# Patient Record
Sex: MALE | Race: WHITE | Employment: FULL TIME | URBAN - METROPOLITAN AREA
[De-identification: names, ages, dates, MRNs, and addresses within clinical notes are randomized per-mention and may not be internally consistent; named-entity substitution may affect disease eponyms.]

---

## 2017-01-27 ENCOUNTER — HOSPITAL ENCOUNTER (EMERGENCY)
Age: 42
Discharge: HOME | End: 2017-01-28
Attending: EMERGENCY MEDICINE
Payer: MEDICAID

## 2017-01-27 VITALS
TEMPERATURE: 97.9 F | HEART RATE: 91 BPM | WEIGHT: 180 LBS | BODY MASS INDEX: 25.77 KG/M2 | OXYGEN SATURATION: 94 % | HEIGHT: 70 IN | SYSTOLIC BLOOD PRESSURE: 121 MMHG | RESPIRATION RATE: 19 BRPM | DIASTOLIC BLOOD PRESSURE: 71 MMHG

## 2017-01-27 DIAGNOSIS — Z72.0 TOBACCO ABUSE: ICD-10-CM

## 2017-01-27 DIAGNOSIS — K02.9 DENTAL DECAY: ICD-10-CM

## 2017-01-27 DIAGNOSIS — K08.89 ODONTALGIA: Primary | ICD-10-CM

## 2017-01-27 PROCEDURE — 99283 EMERGENCY DEPT VISIT LOW MDM: CPT

## 2017-01-27 NOTE — Clinical Note
Be sure to Drink plenty of water. Eat only soft foods. Remember to consume only room temperature solids & liquids. Luke warm salt water rinses three times per day alternating with Listerine rinses three times per day unless otherwise recommended by your  
healthcare provider. Ensure you are flossing your teeth once to twice daily as this will reduce likelihood of gingival/gum infections. Please take Tylenol (Acetaminophen) Extra Strength 500 mg tablets, 2 tablets by mouth every 6-8 hours as needed fo r pain and or fever. NOT TO EXCEED 4000 MG OF ACETAMINOPHEN IN A 24 HOURS PERIOD. You may also take Motrin (Ibuprofen) 200mg tablets, 3 tablets by mouth every 6-8 hours as needed for pain and or fever, to be taken with food. Do not take other NSAIDS (Non -Steroidal Anti-inflammatories) or medications containing: e.g., Celebrex (Celecoxib), Mobic (Meloxicam), Naproxen (Naprosyn), Goodies Powder, Aspirin etc... While taking Ibuprofen (Also known as Motrin, Advil). PLEASE FOLLOW-UP AS DIRECTED. RETURN T O THE EMERGENCY DEPARTMENT IF YOU ARE UNABLE TO FOLLOW-UP AS DIRECTED. RETURN TO THE EMERGENCY DEPARTMENT IF YOU HAVE SYMPTOMS THAT DO NOT IMPROVE WITH TREATMENT, NEW SYMPTOMS, WORSENING SYMPTOMS, OR ANY OTHER CONCERNS. [address]? Contact one of the Dental Clinics below and SCHEDULE an Appointment to be seen within 1 day WITHOUT FAIL! 
 
 
1) [address] 
 
[address] [facility] 
[phone]  
 
2) [address] [phone] or [phone] 
[address] [address] Norm Perez Monday thru Friday 8:00 AM  5:00 PM 
 
After Hours If you have a life threatening emergency, please call 911.   Please identify your provider by name when you obtain services at a local emergency room, urgent care center or you are admitted as inpatient into the hospital. 
 
Our physicians and dentists are on call 24 hours a day, 7 days a week 365 days per year. You can reach y our physician or dentist by calling [phone]. Please Note All locations are closed on Sundays and all major holidays [address] Dental services are available for adults and children at the [place] site and via  the mobile dental Rommie Amber in all areas of [facility]. Services include: 
Examinations and X-rays: * Review and update medical history in order to ensure that dental treatment does not conflict with current health conditions or medications* Oral cancer ex amination* Visual examination and x-rays of teeth to help detect decay or deterioration* Observation of biting, chewing and grinding patterns* Evaluation of gum tissue and bone support* Preventative and restorative dental care* Stain removal, removal of  plaque and tartar and teeth polishing* Recommendations on brushing, flossing, fluoride applications, sealants and frequency of oral examinations* Discussions of concerns or questions regarding regular oral care or possible cosmetic enhancements Preventi ve: * Stain, plaque and tartar removal and teeth polishing* Deep scale using anesthetic when necessary* Recommendations on brushing and flossing* Fluoride applications and dental sealants as needed* Discussion on the frequency for oral examination Rest orative Visits: * Dental fillings such as amalgam or composite restorations* Dental extractions* Root canals of anterior teeth* Partial or complete dentures Emergency Visits: * Extraction or tooth removal. (An appointment to alleviate pain is usually available within 24-48 hours.) Office Hours: Monday-Friday: 8:00am-5:00pm 
For an appointment call: [phone] Fax: [phone] 
Artemio Maribelazar [address] [place], [address] [facility]  3) [address] [address] [place], [facility] E [facility]) [address] at [facility] Monday-Friday 09:00AM-05:00PM 
Tuesday, Wednesday, and Thursday evenings 05:00PM-08:00PM 
 
Open one day per month: 08:00AM-Noon [facility] has legal obligations about who is eligi ble for care. It is important to read and understand the eligibility requirements and to also ensure that you bring the appropriate documentation to your appointment. Beginning 2012 we will require a current Tax Return by all of our patients. We require a  1040 Tax Return. A 4506T is no longer accepted. We cannot renew your eligibility for another year without the  1040 Tax Return. Please note that if your eligibility has  for 90 days or more then you will become inactive. E ligibility Requirements You are eligible if you meet the following criteria. 1. You do not have medical insurance, Medicaid, Medicare or [place] Benefits. 2. Your earnings are 200% or less of federal poverty guidelines (see chart below) Number in Fa nicolas 200% Federal Poverty Level Gross Yearly Salary Gross Monthly Income* Approximate Hourly Income** 
1 $22,340.00 $1,861.00 $10.74 
2 $30,260.00 $2,521.00 $14.55 
3 $38,180.00 $3,181.00 $18.36 
4 $46,100.00 $3,841.00 $22.16 
5 $54,920.00 $4,501.00 $2 6.40 
6 $61,940.00 $5,161.00 $29.78 
7 $69,860.00 $5,821.00 $33.59 
8 $77,780.00 $6,481.00 $37.39 For each additional person, add: 
 $7,920.00 $660.00 $3.81 * Yearly income divided by 12 Months **Assumes a full-time job for a full year (2080 hours) 3 . For medical care you must be a [place] resident. Any resident of [facility] may qualify for free dental services. You will be given an appointment time to attend an eligibility clinic to pre-qualify before being scheduled for medical or dental  care. Before coming to the appointment please fill out the Eligibility Checklist and ensure that you have all documents for your appointment. You'll be helped to complete all required paperwork such as contact information, medical history, consent fo rm, proof of residency, income and identification. When information is complete and you meet the income, residency and insurance guidelines, you'll be scheduled for your first appointment. If we do not have availability or you do not meet our criteria,  click the Additional Resources button below for other options. Making a Medical Appointment once you are a deemed eligible and a [facility] patient Call [phone] ext. [phone]. If no one answers leave a message. YOUR CALL WILL BE RETURNED. Making a De ntal Appointment once you are deemed an eligible patient call [phone] ext.[phone]. Please note: They are only here part time. Your call goes on a wait list and the Dental Coordinator will call you back just as soon as she has availability. Making an appo intment for the [address] Open to residents of [facility] only. ([place] ell[place], [place], [place], [place], [place], [place] car[place], [place]) Because of the volume of patients needing care, [facility] operates [address]. To Ma ke an Appointment (if you are NOT a current [facility] patient) Call [facility] at [phone] and select OPTION 2 when prompted You must first make an eligibility appointment to determine if you qualify for care You will be told if there is a waiting list and given additional information You will be called back later with a date/time and be given a list of documents to bring to your eligibility appointment You will be interviewed in person to determine if you qualify for care If y ou qualify for dental care, your first appointment may be scheduled. ELVIN KHAN'S  PROGRAM:  
 
NEED HELP ARRANGING AN APPOINTMENT WITH A CLINIC OR SPECIALIST? [address] [place] [address] Drive are well versed in our [employer] and have  connections with Physician offices which often allows them to schedule appointments within the time frame recommended!! 
 
[address] OF APPOINTMENT SCHEDULING AND FOLLOW-UP IS THAT OF  E PATIENT. DON'T HAVE THE FINANCIAL RESOURCES AVAILABLE TO PAY FOR A DOCTORS APPOINTMENT? WANT TO LEARN MORE ABOUT LOCAL FREE AND OR POTENTIALLY DISCOUNTED MEDICAL SERVICES? WANT TO KNOW MORE ABOUT THE COMMUNITY RESOURCES AVAILABLE TO YOU IN YOUR  AREA? Philipp Gonzales  Program 
Philipp Gonzales life coaches are employees of [employer] of [facility]. If any patient meets the criteria for  services  low-income, lack of insurance and no regular source of primary care  angella reyna coaches call that person to provide information and offer assistance. They can help set up ongoing care at [facility], a free clinic located on the [facility] grounds, or with the [facility], a mobile clinic operated  by [facility] for the [facility]. [facility] [address] [facility], [address] [facility] Please call:  
Mary Lou Castillo [phone] Kaitlynn Marroquin [phone] 
 
(Usual hours are Monday-Karine y 0800 AM-0800 PM) Michelle's  Program 
For Life Coaches, [facility] partner with Yahir Coleman (GHAZAL). Patients in need of a primary care physician are referred to [facility] medical staff. STOP provides the LPNs to work at [employer] and also assists with issues around education, housing, employment and  services. The two life coaches, both licensed practical nurses, help arrange the appointments and other servi richard at [employer]. [address] [facility] [address] [facility] [address] [facility] March [phone] Iva [phone] The  Program connects patients with no insurance, little resources, or no personal do ctor with primary care resources to minimize their trips to the emergency room. These coaches work closely with the emergency department staff to find medical homes for patients who regularly use emergency rooms for non-emergency issues. Operating out o [employer] and at [facility], life coaches, formally known as medical  navigators, help patients overcome social barriers to care, make follow up appointments, and obtain medications. The  Model of Care was implem ented by placing two LPNs into the [facility] Emergency Department (ED) with the purpose of connecting patients in need with primary care physicians. In the first year of the program (September 2008-September 2009), life coaches assisted 1,000 pa tients. Of those, only 12 returned to the ED for the same condition. The rest are now being cared for by their own doctors thanks to relationships established by the [employer]. About 90 percent of everyone referred to primary care doctors made  contact and set up appointments.

## 2017-01-28 PROCEDURE — 74011250637 HC RX REV CODE- 250/637: Performed by: PHYSICIAN ASSISTANT

## 2017-01-28 RX ORDER — OXYCODONE AND ACETAMINOPHEN 5; 325 MG/1; MG/1
1 TABLET ORAL
Status: COMPLETED | OUTPATIENT
Start: 2017-01-28 | End: 2017-01-28

## 2017-01-28 RX ORDER — CHLORHEXIDINE GLUCONATE 1.2 MG/ML
15 RINSE ORAL 2 TIMES DAILY
Qty: 420 ML | Refills: 0 | Status: SHIPPED | OUTPATIENT
Start: 2017-01-28 | End: 2017-02-11

## 2017-01-28 RX ORDER — CLINDAMYCIN HYDROCHLORIDE 150 MG/1
300 CAPSULE ORAL
Status: COMPLETED | OUTPATIENT
Start: 2017-01-28 | End: 2017-01-28

## 2017-01-28 RX ORDER — OXYCODONE AND ACETAMINOPHEN 5; 325 MG/1; MG/1
1 TABLET ORAL
Qty: 12 TAB | Refills: 0 | Status: SHIPPED | OUTPATIENT
Start: 2017-01-28

## 2017-01-28 RX ORDER — ALBUTEROL SULFATE 0.83 MG/ML
2.5 SOLUTION RESPIRATORY (INHALATION)
Refills: 0 | COMMUNITY
Start: 2017-01-06 | End: 2017-01-28 | Stop reason: CLARIF

## 2017-01-28 RX ORDER — CLINDAMYCIN HYDROCHLORIDE 300 MG/1
300 CAPSULE ORAL 4 TIMES DAILY
Qty: 40 CAP | Refills: 0 | Status: SHIPPED | OUTPATIENT
Start: 2017-01-28 | End: 2017-02-07

## 2017-01-28 RX ORDER — PENICILLIN V POTASSIUM 250 MG/1
500 TABLET, FILM COATED ORAL
Status: DISCONTINUED | OUTPATIENT
Start: 2017-01-28 | End: 2017-01-28

## 2017-01-28 RX ADMIN — OXYCODONE HYDROCHLORIDE AND ACETAMINOPHEN 1 TABLET: 5; 325 TABLET ORAL at 00:22

## 2017-01-28 RX ADMIN — CLINDAMYCIN HYDROCHLORIDE 300 MG: 150 CAPSULE ORAL at 00:22

## 2017-01-28 NOTE — ED TRIAGE NOTES
\"my mouth is really hurting. I was brushing my teeth tonight and a piece of tooth came out\" patient has been taking amoxicillin for gum infection.

## 2017-01-28 NOTE — DISCHARGE INSTRUCTIONS
[employer]ism[employer] Activation    Thank you for requesting access to [employer]. Please follow the instructions below to securely access and download your online medical record. [employer] allows you to send messages to your doctor, view your test results, renew your prescriptions, schedule appointments, and more. How Do I Sign Up? 1. In your internet browser, go to [email or web address]  2. Click on the First Time User? Click Here link in the Sign In box. You will be redirect to the New Member Sign Up page. 3. Enter your Prism[employer] Access Code exactly as it appears below. You will not need to use this code after youve completed the sign-up process. If you do not sign up before the expiration date, you must request a new code. PrismTech Access Code: [email or web address]  Expires: 2017  8:37 PM (This is the date your PrismTech access code will )    4. Enter the last four digits of your Social Security Number (xxxx) and Date of Birth (mm/dd/yyyy) as indicated and click Submit. You will be taken to the next sign-up page. 5. Create a PrismTech ID. This will be your PrismTech login ID and cannot be changed, so think of one that is secure and easy to remember. 6. Create a PrismTech password. You can change your password at any time. 7. Enter your Password Reset Question and Answer. This can be used at a later time if you forget your password. 8. Enter your e-mail address. You will receive e-mail notification when new information is available in [address] 9. Click Sign Up. You can now view and download portions of your medical record. 10. Click the Download Summary menu link to download a portable copy of your medical information. Additional Information    If you have questions, please visit the Frequently Asked Questions section of the Prism[employer] website at [email or web address] [employer]/. Remember, [employer] is NOT to be used for urgent needs. For medical emergencies, dial 911.

## 2017-01-28 NOTE — ED PROVIDER NOTES
HPI Comments: Ute Mccain is a  46 y.o. Normal build male smoker h/o Asthma presents to the ED via POV c/o right lower dental pain x 1 month worse over the past few days. Pt has been on Pen VK in the past 2 months. Denies fever, chills, HA, dizziness, ear pain, sinus pain/congestion/runny nose, difficulty opening mouth, tongue swelling, ST, difficulty swallowing, choking sensation, neck pain/stiffness/swelling, CP, SOB, Palps, cough, abd pain, n/v/d. He reports going to [facility] after Thanksgiving for tooth extraction (x2 teeth) right lower, however; the extraction failed to remove a corner of one tooth per patient and since then there is a piece of residual tooth protruding through gum. He recently completed Amoxicillin. He has a scheduled appointment with [address] [place] [address] Oral Surgery this coming Wednesday. Patient is a 46 y.o. male presenting with dental problem. Dental Pain             Past Medical History:   Diagnosis Date    Asthma     Odontalgia     Tobacco abuse        Past Surgical History:   Procedure Laterality Date    Hx appendectomy           History reviewed. No pertinent family history. Social History     Social History    Marital status:      Spouse name: N/A    Number of children: N/A    Years of education: N/A     Occupational History    Not on file. Social History Main Topics    Smoking status: Current Every Day Smoker     Packs/day: 0.25    Smokeless tobacco: Not on file    Alcohol use Yes      Comment: occassionally    Drug use: No    Sexual activity: Yes     Partners: Female     Other Topics Concern    Not on file     Social History Narrative         ALLERGIES: Review of patient's allergies indicates no known allergies. Review of Systems   Constitutional: Negative for chills and fever. HENT: Positive for dental problem.  Negative for congestion, drooling, ear discharge, ear pain, facial swelling, postnasal drip, rhinorrhea, sinus pressure, sore throat, trouble swallowing and voice change. Eyes: Negative for pain and discharge. Respiratory: Negative for cough and shortness of breath. Cardiovascular: Negative for chest pain and palpitations. Gastrointestinal: Negative for abdominal pain, diarrhea, nausea and vomiting. Genitourinary: Negative for dysuria, frequency and urgency. Musculoskeletal: Negative for arthralgias and joint swelling. Skin: Negative for color change, pallor, rash and wound. Neurological: Negative for dizziness, syncope, weakness and headaches. Psychiatric/Behavioral: Negative for behavioral problems. Vitals:    01/27/17 2258   BP: 121/71   Pulse: 91   Resp: 19   Temp: 97.9 °F (36.6 °C)   SpO2: 94%   Weight: 81.6 kg (180 lb)   Height: 5' 10\" (1.778 m)            Physical Exam   Constitutional: He is oriented to person, place, and time. He appears well-developed and well-nourished. No distress. HENT:   Head: Normocephalic and atraumatic. Right Ear: Hearing, tympanic membrane, external ear and ear canal normal.   Left Ear: Hearing, tympanic membrane, external ear and ear canal normal.   Nose: Nose normal. No mucosal edema, rhinorrhea or sinus tenderness. Right sinus exhibits no maxillary sinus tenderness and no frontal sinus tenderness. Left sinus exhibits no maxillary sinus tenderness and no frontal sinus tenderness. Mouth/Throat: Uvula is midline, oropharynx is clear and moist and mucous membranes are normal. No trismus in the jaw. Abnormal dentition. No dental abscesses, uvula swelling or lacerations. No oropharyngeal exudate, posterior oropharyngeal edema, posterior oropharyngeal erythema or tonsillar abscesses. Dental pain at # 2. Fragment of tooth protruding through gum line. There IS evidence of dental decay. There IS tenderness on palpation. The airway IS patent. NO adjacent mucobuccal soft tissue swelling, fluctuance or gingival bleeding. NO pus/drainage. NO swelling or elevation of the tongue.    NO sublingual swelling or TTP. NO facial swelling. NO neck swelling. No sublingual swelling, edema, pus/drainage. Eyes: Conjunctivae and EOM are normal. Pupils are equal, round, and reactive to light. Right eye exhibits no discharge. Left eye exhibits no discharge. No scleral icterus. Neck: Trachea normal, normal range of motion, full passive range of motion without pain and phonation normal. Neck supple. No tracheal tenderness, no spinous process tenderness and no muscular tenderness present. No rigidity. No tracheal deviation, no edema, no erythema and normal range of motion present. No thyroid mass and no thyromegaly present. Supple Symmetric Neck w/o LAD. No stridor. Normal phonation. Cardiovascular: Normal rate, regular rhythm and normal heart sounds. Exam reveals no gallop and no friction rub. No murmur heard. Pulmonary/Chest: Effort normal and breath sounds normal. No accessory muscle usage or stridor. No tachypnea. No respiratory distress. He has no decreased breath sounds. He has no wheezes. He has no rhonchi. He has no rales. Abdominal: Soft. Bowel sounds are normal. There is no tenderness. There is no rigidity, no CVA tenderness, no tenderness at McBurney's point and negative Gray's sign. Lymphadenopathy:     He has no cervical adenopathy. Neurological: He is alert and oriented to person, place, and time. He has normal strength. He is not disoriented. No sensory deficit. MS 5/5 BUE/BLE   Skin: Skin is warm, dry and intact. No rash noted. He is not diaphoretic. No cyanosis. No pallor. Psychiatric: He has a normal mood and affect. His behavior is normal.   Nursing note and vitals reviewed.        MDM  Number of Diagnoses or Management Options  Dental decay: new and requires workup  Odontalgia: new and requires workup  Tobacco abuse: new and requires workup  Diagnosis management comments: DDX: Periapical abscess, Trigeminal neuralgia,  space infection, Christiano's angina, Retropharyngeal space infection, Infection after root canal, Dental caries, Odontalgia, Dry Socket, Acute Necrotizing Ulcerative Gingivitis (ANUG). Clindamycin 300 mg QID x 10 days. Tylenol/Motrin prn pain. Percocet prn breakthrough pain. F/U Dental in 1 day. Drink plenty of water. Soft foods only. Luke warm solids & liquids. Warm salt water rinses TID. Listerine rinses TID. RT ED with any worsening s/sx. PLEASE FOLLOW-UP AS DIRECTED WITHOUT FAIL WITHIN THE TIME FRAME RECOMMENDED AS FAILURE TO DO SO COULD RESULT IN WORSENING OF YOUR PHYSICAL CONDITION, DEATH, AND OR PERMANENT DISABILITY. RETURN TO THE EMERGENCY DEPARTMENT IF YOU ARE UNABLE TO FOLLOW-UP AS DIRECTED. RETURN TO THE EMERGENCY DEPARTMENT IF YOU HAVE SYMPTOMS THAT DO NOT IMPROVE WITH TREATMENT, NEW SYMPTOMS, WORSENING SYMPTOMS, OR ANY OTHER CONCERNS. Amount and/or Complexity of Data Reviewed  Decide to obtain previous medical records or to obtain history from someone other than the patient: yes  Review and summarize past medical records: yes    Risk of Complications, Morbidity, and/or Mortality  Presenting problems: moderate  Diagnostic procedures: low  Management options: moderate    Patient Progress  Patient progress: stable      Procedures    Diagnosis:   1. Odontalgia    2. Dental decay    3. Tobacco abuse          Disposition: HOME    Follow-up Information     Follow up With Details Comments Contact Info    [facility] EMERGENCY DEPT  As needed, If symptoms worsen [address] [place] [address] [address] In 1 day Re-evaluation without fail. [address] [facility]    Beth Mcmillan MD Call in 2 days Schedule appointment to be seen same day without fail! [address]   [address] [address]  [phone]            Patient's Medications   Start Taking    CHLORHEXIDINE (PERIDEX) 0.12 % SOLUTION 15 mL by Swish and Spit route two (2) times a day for 14 days. CLINDAMYCIN (CLEOCIN) 300 MG CAPSULE    Take 1 Cap by mouth four (4) times daily for 10 days. OXYCODONE-ACETAMINOPHEN (PERCOCET) 5-325 MG PER TABLET    Take 1 Tab by mouth every four (4) hours as needed (BREAKTHROUGH PAIN ONLY, DO NOT FILL UNLESS PERIDEX SOLUTION & CLINDAMYCIN IS FILLED.). Max Daily Amount: 6 Tabs. Continue Taking    ALBUTEROL (PROAIR HFA) 90 MCG/ACTUATION INHALER    Take 1 Puff by inhalation every four (4) hours as needed for Wheezing. ALBUTEROL (PROVENTIL VENTOLIN) 2.5 MG /3 ML (0.083 %) NEBULIZER SOLUTION    2.5 mg by Nebulization route every four (4) hours as needed. These Medications have changed    No medications on file   Stop Taking    OXYCODONE-ACETAMINOPHEN (PERCOCET) 5-325 MG PER TABLET    Take 1 Tab by mouth every four (4) hours as needed for Pain. Max Daily Amount: 6 Tabs. No results found for this or any previous visit (from the past 24 hour(s)).

## 2017-01-28 NOTE — ED NOTES
Patient with dental pain on bottom right tooth from tooth injury after extraction patient reports going to oral surgeon to repair damage from extraction and now has bone protruding from his gum.